# Patient Record
Sex: FEMALE | Race: BLACK OR AFRICAN AMERICAN | Employment: OTHER | URBAN - METROPOLITAN AREA
[De-identification: names, ages, dates, MRNs, and addresses within clinical notes are randomized per-mention and may not be internally consistent; named-entity substitution may affect disease eponyms.]

---

## 2021-04-13 ENCOUNTER — HOSPITAL ENCOUNTER (OUTPATIENT)
Age: 86
Setting detail: SPECIMEN
Discharge: HOME | End: 2021-04-13
Payer: MEDICARE

## 2021-04-13 LAB
A/G RATIO: 1.8 (ref 1.1–2.2)
ALBUMIN SERPL-MCNC: 4.4 G/DL (ref 3.4–5)
ALP BLD-CCNC: 49 U/L (ref 40–129)
ALT SERPL-CCNC: 10 U/L (ref 10–40)
ANION GAP SERPL CALCULATED.3IONS-SCNC: 9 MMOL/L (ref 3–16)
AST SERPL-CCNC: 24 U/L (ref 15–37)
BACTERIA: ABNORMAL /HPF
BASOPHILS ABSOLUTE: 0 K/UL (ref 0–0.2)
BASOPHILS RELATIVE PERCENT: 0.7 %
BILIRUB SERPL-MCNC: <0.2 MG/DL (ref 0–1)
BILIRUBIN URINE: NEGATIVE
BLOOD, URINE: NEGATIVE
BUN BLDV-MCNC: 28 MG/DL (ref 7–20)
CALCIUM SERPL-MCNC: 10 MG/DL (ref 8.3–10.6)
CHLORIDE BLD-SCNC: 104 MMOL/L (ref 99–110)
CLARITY: ABNORMAL
CO2: 28 MMOL/L (ref 21–32)
COLOR: YELLOW
COMMENT UA: ABNORMAL
CREAT SERPL-MCNC: 1.3 MG/DL (ref 0.6–1.2)
EOSINOPHILS ABSOLUTE: 0 K/UL (ref 0–0.6)
EOSINOPHILS RELATIVE PERCENT: 1.3 %
EPITHELIAL CELLS, UA: 4 /HPF (ref 0–5)
GFR AFRICAN AMERICAN: 46
GFR NON-AFRICAN AMERICAN: 38
GLOBULIN: 2.5 G/DL
GLUCOSE BLD-MCNC: 83 MG/DL (ref 70–99)
GLUCOSE URINE: NEGATIVE MG/DL
HCT VFR BLD CALC: 35.1 % (ref 36–48)
HEMOGLOBIN: 11 G/DL (ref 12–16)
HYALINE CASTS: 6 /LPF (ref 0–8)
KETONES, URINE: NEGATIVE MG/DL
LEUKOCYTE ESTERASE, URINE: ABNORMAL
LYMPHOCYTES ABSOLUTE: 1.1 K/UL (ref 1–5.1)
LYMPHOCYTES RELATIVE PERCENT: 30.5 %
MCH RBC QN AUTO: 27.4 PG (ref 26–34)
MCHC RBC AUTO-ENTMCNC: 31.4 G/DL (ref 31–36)
MCV RBC AUTO: 87.3 FL (ref 80–100)
MICROSCOPIC EXAMINATION: YES
MONOCYTES ABSOLUTE: 0.2 K/UL (ref 0–1.3)
MONOCYTES RELATIVE PERCENT: 6 %
NEUTROPHILS ABSOLUTE: 2.2 K/UL (ref 1.7–7.7)
NEUTROPHILS RELATIVE PERCENT: 61.5 %
NITRITE, URINE: NEGATIVE
PDW BLD-RTO: 15.2 % (ref 12.4–15.4)
PH UA: 5 (ref 5–8)
PLATELET # BLD: 193 K/UL (ref 135–450)
PMV BLD AUTO: 9.3 FL (ref 5–10.5)
POTASSIUM SERPL-SCNC: 5 MMOL/L (ref 3.5–5.1)
PROTEIN UA: NEGATIVE MG/DL
RBC # BLD: 4.02 M/UL (ref 4–5.2)
RBC UA: 4 /HPF (ref 0–4)
SODIUM BLD-SCNC: 141 MMOL/L (ref 136–145)
SPECIFIC GRAVITY UA: 1.02 (ref 1–1.03)
TOTAL CK: 128 U/L (ref 26–192)
TOTAL PROTEIN: 6.9 G/DL (ref 6.4–8.2)
TSH REFLEX: 2.61 UIU/ML (ref 0.27–4.2)
URINE TYPE: ABNORMAL
UROBILINOGEN, URINE: 0.2 E.U./DL
WBC # BLD: 3.6 K/UL (ref 4–11)
WBC UA: 23 /HPF (ref 0–5)

## 2021-04-13 PROCEDURE — 85025 COMPLETE CBC W/AUTO DIFF WBC: CPT

## 2021-04-13 PROCEDURE — 80053 COMPREHEN METABOLIC PANEL: CPT

## 2021-04-13 PROCEDURE — 36415 COLL VENOUS BLD VENIPUNCTURE: CPT

## 2021-04-13 PROCEDURE — 82550 ASSAY OF CK (CPK): CPT

## 2021-04-13 PROCEDURE — 82728 ASSAY OF FERRITIN: CPT

## 2021-04-13 PROCEDURE — 87086 URINE CULTURE/COLONY COUNT: CPT

## 2021-04-13 PROCEDURE — 81001 URINALYSIS AUTO W/SCOPE: CPT

## 2021-04-13 PROCEDURE — 87077 CULTURE AEROBIC IDENTIFY: CPT

## 2021-04-13 PROCEDURE — 83550 IRON BINDING TEST: CPT

## 2021-04-13 PROCEDURE — 84443 ASSAY THYROID STIM HORMONE: CPT

## 2021-04-13 PROCEDURE — 82746 ASSAY OF FOLIC ACID SERUM: CPT

## 2021-04-13 PROCEDURE — 82607 VITAMIN B-12: CPT

## 2021-04-13 PROCEDURE — 87184 SC STD DISK METHOD PER PLATE: CPT

## 2021-04-13 PROCEDURE — 83540 ASSAY OF IRON: CPT

## 2021-04-13 PROCEDURE — 87186 SC STD MICRODIL/AGAR DIL: CPT

## 2021-04-14 LAB
FERRITIN: 139.5 NG/ML (ref 15–150)
FOLATE: 13.56 NG/ML (ref 4.78–24.2)
IRON SATURATION: 20 % (ref 15–50)
IRON: 51 UG/DL (ref 37–145)
TOTAL IRON BINDING CAPACITY: 250 UG/DL (ref 260–445)
VITAMIN B-12: 596 PG/ML (ref 211–911)

## 2021-04-16 LAB
ORGANISM: ABNORMAL
URINE CULTURE, ROUTINE: ABNORMAL
URINE CULTURE, ROUTINE: ABNORMAL

## 2023-01-11 ENCOUNTER — HOSPITAL ENCOUNTER (EMERGENCY)
Age: 88
Discharge: HOME | End: 2023-01-12
Attending: STUDENT IN AN ORGANIZED HEALTH CARE EDUCATION/TRAINING PROGRAM
Payer: MEDICARE

## 2023-01-11 VITALS
BODY MASS INDEX: 17.42 KG/M2 | HEART RATE: 75 BPM | SYSTOLIC BLOOD PRESSURE: 138 MMHG | WEIGHT: 102 LBS | HEIGHT: 64 IN | OXYGEN SATURATION: 100 % | RESPIRATION RATE: 16 BRPM | TEMPERATURE: 98.3 F | DIASTOLIC BLOOD PRESSURE: 68 MMHG

## 2023-01-11 DIAGNOSIS — R04.0 EPISTAXIS: Primary | ICD-10-CM

## 2023-01-11 LAB
BASOPHILS ABSOLUTE: 0.1 K/UL (ref 0–0.2)
BASOPHILS RELATIVE PERCENT: 1 %
EOSINOPHILS ABSOLUTE: 0.1 K/UL (ref 0–0.6)
EOSINOPHILS RELATIVE PERCENT: 1.8 %
HCT VFR BLD CALC: 25.6 % (ref 36–48)
HEMOGLOBIN: 8.4 G/DL (ref 12–16)
INR BLD: 1.24 (ref 0.87–1.14)
LYMPHOCYTES ABSOLUTE: 0.8 K/UL (ref 1–5.1)
LYMPHOCYTES RELATIVE PERCENT: 14.7 %
MCH RBC QN AUTO: 27.8 PG (ref 26–34)
MCHC RBC AUTO-ENTMCNC: 32.7 G/DL (ref 31–36)
MCV RBC AUTO: 85 FL (ref 80–100)
MONOCYTES ABSOLUTE: 0.5 K/UL (ref 0–1.3)
MONOCYTES RELATIVE PERCENT: 9 %
NEUTROPHILS ABSOLUTE: 3.9 K/UL (ref 1.7–7.7)
NEUTROPHILS RELATIVE PERCENT: 73.5 %
PDW BLD-RTO: 14.8 % (ref 12.4–15.4)
PLATELET # BLD: 415 K/UL (ref 135–450)
PMV BLD AUTO: 7.8 FL (ref 5–10.5)
PROTHROMBIN TIME: 15.5 SEC (ref 11.7–14.5)
RBC # BLD: 3.02 M/UL (ref 4–5.2)
WBC # BLD: 5.4 K/UL (ref 4–11)

## 2023-01-11 PROCEDURE — 99283 EMERGENCY DEPT VISIT LOW MDM: CPT

## 2023-01-11 PROCEDURE — 36415 COLL VENOUS BLD VENIPUNCTURE: CPT

## 2023-01-11 PROCEDURE — 85610 PROTHROMBIN TIME: CPT

## 2023-01-11 PROCEDURE — 85025 COMPLETE CBC W/AUTO DIFF WBC: CPT

## 2023-01-11 PROCEDURE — 2500000003 HC RX 250 WO HCPCS: Performed by: STUDENT IN AN ORGANIZED HEALTH CARE EDUCATION/TRAINING PROGRAM

## 2023-01-11 PROCEDURE — 6370000000 HC RX 637 (ALT 250 FOR IP): Performed by: STUDENT IN AN ORGANIZED HEALTH CARE EDUCATION/TRAINING PROGRAM

## 2023-01-11 RX ORDER — OXYMETAZOLINE HYDROCHLORIDE 0.05 G/100ML
2 SPRAY NASAL ONCE
Status: COMPLETED | OUTPATIENT
Start: 2023-01-11 | End: 2023-01-11

## 2023-01-11 RX ORDER — TRANEXAMIC ACID 100 MG/ML
1000 INJECTION, SOLUTION INTRAVENOUS ONCE
Status: COMPLETED | OUTPATIENT
Start: 2023-01-11 | End: 2023-01-11

## 2023-01-11 RX ADMIN — OXYMETAZOLINE HCL 2 SPRAY: 0.05 SPRAY NASAL at 20:54

## 2023-01-11 RX ADMIN — TRANEXAMIC ACID 1000 MG: 1 INJECTION, SOLUTION INTRAVENOUS at 22:27

## 2023-01-11 RX ADMIN — Medication 1 EACH: at 23:36

## 2023-01-11 ASSESSMENT — PAIN - FUNCTIONAL ASSESSMENT: PAIN_FUNCTIONAL_ASSESSMENT: NONE - DENIES PAIN

## 2023-01-12 NOTE — ED PROVIDER NOTES
THE [facility]  EMERGENCY DEPARTMENT ENCOUNTER          EM RESIDENT NOTE     Date of Evaluation: 1/11/2023    Chief Complaint     Epistaxis (Pt arrived via EMS from [facility] after a spontaneous nosebleed per nursing staff and EMS.  No trauma or fall.)    History of Present Illness     Nimisha Rutherford is a 89 y.o. female with PMHx T2DM, HTN, HLD who presents with epistaxis.  Patient presents via EMS from [facility] after a spontaneous nosebleed per nursing staff.  Patient denies any trauma to the area or any additional trauma.  She states that she has had nosebleeds in the past but is never had 1 so severe.  She does not take any anticoagulants or other blood thinning medications.  She denies any other symptoms at this time.  She has been applying pressure without any resolution of her epistaxis.  She has passed several large clots.    On ROS, she denies any fevers, chills, sore throat, cough, shortness of breath, chest pain, abdominal pain, nausea/vomiting, diarrhea, hematochezia, melena, lower extremity swelling, rash, headache, lightheadedness, dizziness, loss of consciousness.    Review of Systems     Review of Systems   All other systems reviewed and are negative.    Past Medical, Surgical, Family, and Social History     She has a past medical history of Diabetes mellitus (HCC), HLD (hyperlipidemia), and HTN (hypertension).  She has no past surgical history on file.  Her family history is not on file.  She reports that she has never smoked. She does not have any smokeless tobacco history on file. She reports that she does not drink alcohol and does not use drugs.    Medications     Discharge Medication List as of 1/12/2023 12:54 AM        CONTINUE these medications which have NOT CHANGED    Details   ezetimibe (ZETIA) 10 MG tablet Take 1 tablet by mouth daily, Disp-90 tablet, R-0Normal      rosuvastatin (CRESTOR) 20 MG tablet Take 1 tablet by mouth daily, Disp-30 tablet, R-5     amLODIPine (NORVASC) 10 MG tablet Take 10 mg by mouth daily       donepezil (ARICEPT) 5 MG tablet Take 5 mg by mouth      linagliptin (TRADJENTA) 5 MG tablet Take 5 mg by mouth      lisinopril (PRINIVIL;ZESTRIL) 5 MG tablet Take 5 mg by mouth daily       timolol (TIMOPTIC) 0.5 % ophthalmic solution Place 1 drop into both eyes , R-6      aspirin 81 MG tablet Take 81 mg by mouth daily           Allergies     She has No Known Allergies. Physical Exam     INITIAL VITALS:   BP: 138/68, Temp: 98.3 °F (36.8 °C), Heart Rate: 75, Resp: 16, SpO2: 100 %     General: 80 y.o. female, non-toxic appearing; in no apparent distress. Head: Normocephalic, atraumatic. Eyes: Anicteric. PERRL, EOMI.  ENT: Mucous membranes are moist. Significant epistaxis primarily from her right nare. Passage of several large clots. Unable to identify site of bleeding given ongoing epistaxis. No evidence of obvious trauma. Neck: Supple, full ROM, trachea midline. Pulmonary: Non-labored breathing. Lungs clear to auscultation bilaterally with symmetric aeration. No wheezes/rales/rhonchi. Cardiac: Regular rate and rhythm. No murmurs/rubs/gallops. Abdomen: Soft, non-tender, non-distended. No rebound or guarding. Musculoskeletal: No long bone extremity deformity. Extremities: Warm and well-perfused. No peripheral edema. Skin: No rashes or bruising. Neuro: Alert and oriented x3. Speech normal. Moves UE and LE spontaneously and symmetrically. Psych: Mood and affect appropriate for situation.     Diagnostic Results     EKG:  None    RADIOLOGY:  No orders to display     LABS:  Results for orders placed or performed during the hospital encounter of 01/11/23   CBC with Auto Differential   Result Value Ref Range    WBC 5.4 4.0 - 11.0 K/uL    RBC 3.02 (L) 4.00 - 5.20 M/uL    Hemoglobin 8.4 (L) 12.0 - 16.0 g/dL    Hematocrit 25.6 (L) 36.0 - 48.0 %    MCV 85.0 80.0 - 100.0 fL    MCH 27.8 26.0 - 34.0 pg    MCHC 32.7 31.0 - 36.0 g/dL    RDW 14.8 12.4 - 15.4 %    Platelets 246 569 - 525 K/uL    MPV 7.8 5.0 - 10.5 fL    Neutrophils % 73.5 %    Lymphocytes % 14.7 %    Monocytes % 9.0 %    Eosinophils % 1.8 %    Basophils % 1.0 %    Neutrophils Absolute 3.9 1.7 - 7.7 K/uL    Lymphocytes Absolute 0.8 (L) 1.0 - 5.1 K/uL    Monocytes Absolute 0.5 0.0 - 1.3 K/uL    Eosinophils Absolute 0.1 0.0 - 0.6 K/uL    Basophils Absolute 0.1 0.0 - 0.2 K/uL   Protime-INR   Result Value Ref Range    Protime 15.5 (H) 11.7 - 14.5 sec    INR 1.24 (H) 0.87 - 1.14     ED BEDSIDE ULTRASOUND:  None    RECENT VITALS:   BP: 138/68, Temp: 98.3 °F (36.8 °C), Heart Rate: 75,Resp: 16, SpO2: 100 %     Procedures     None    ED Course     Nursing Notes, Past Medical Hx, Past Surgical Hx, Social Hx, Allergies, and Family Hx were reviewed. PATIENT GIVEN FOLLOWING MEDICATIONS:  Orders Placed This Encounter   Medications    oxymetazoline (AFRIN) 0.05 % nasal spray 2 spray    tranexamic acid (CYKLOKAPRON) injection 1,000 mg    silver nitrate applicators applicator 1 each     CONSULTS:  None    MEDICAL DECISION MAKING / ASSESSMENT / Susan Grove is a 80 y.o. female who presents with spontaneous epistaxis. Upon presentation, the patient was non-toxic appearing; afebrile, and hemodynamically stable. Reassuringly, patient is without any respiratory distress and is tolerating her secretions. She denies any anticoagulation use. We will attempt control of her epistaxis using Afrin, topical TXA, and direct pressure. Additionally, we will check a CBC given her baseline hemoglobin is ~9 to assess for any anemia secondary to her ongoing epistaxis. Additionally we will check a PT/INR to assess for a significant coagulopathy. ED Course as of 01/12/23 0417 Wed Jan 11, 2023 2145 Patient's epistaxis has stopped at this time after 2 treatments with Afrin. We will continue to monitor in the emergency department for rebleeding [CB]   2244 INR(!): 1.24  Mildly elevated.   Patient denies any anticoagulant use [CB]   2245 Hemoglobin Quant(!): 8.4  Anemia to 8.4, grossly unchanged from most recent prior hemoglobin of 8.9 on 1/6/2023 [CB]   2330 Patient has been monitored for ~2 hours after her epistaxis stopped after treatment with Afrin, topical TXA, and pressure. Examined patient's nasopharynx and identified site of bleeding at [facility] plexus in her right nare. As such, chemically cauterized the area with silver nitrate. Patient experienced no rebleeding following this. Patient does not ambulate at baseline so we will defer ambulating the patient to assess for rebleeding. Patient is safe for discharge at this time. Provided patient with nose clamps and counseled her how to stop any recurrent bleeding. Recommended she follow-up with her PCP [CB]      ED Course User Index  [CB] Mj Frederick MD     Medical Decision Making  Problems Addressed:  Epistaxis: acute illness or injury    Amount and/or Complexity of Data Reviewed  Independent Historian: caregiver and EMS     Details: Reviewed external labs for baseline Hgb  External Data Reviewed: labs. Labs: ordered. Decision-making details documented in ED Course. Risk  OTC drugs. Prescription drug management. This patient was also evaluated by the attending physician. All care plans were discussed and agreed upon. Clinical Impression     1. Epistaxis      Disposition     PATIENT REFERRED TO:  Valerie White MD        DISCHARGE MEDICATIONS:  Discharge Medication List as of 1/12/2023 12:54 AM        DISPOSITION Decision To Discharge 01/11/2023 11:43:45 PM  At this time the patient has been deemed safe for discharge. Risks, benefits, and alternatives were discussed. My customary discharge instructions including strict return precautions for worsening or new symptoms have been communicated. The patient was advised to follow up with her PCP.     Mj Frederick MD   Emergency Medicine, PGY-2    This note was dictated using voice-recognition software, which occasionally leads to inadvertent typographic errors.        Karlene Pablo MD  01/12/23 8999

## 2023-01-12 NOTE — DISCHARGE INSTRUCTIONS
You were seen in the emergency department with nosebleeding. We were able to stop your nosebleeding with pressure and several medications that help constrict your blood vessels. Additionally, we identified the site of the nosebleed in your right nostril. We used a chemical to cauterize this area to prevent further bleeding. If you have recurrent bleeding at your facility, please lean forward and apply the nose clamp as we have provided to try and stop the bleeding. Please return to the emergency department if you develop nosebleeding that will not stop, fevers, chest pain, shortness of breath, or any other symptoms that concern you.

## 2023-01-12 NOTE — ED PROVIDER NOTES
ED Attending Attestation Note     Date of evaluation: 1/11/2023    This patient was seen by the resident. I have seen and examined the patient, agree with the workup, evaluation, management and diagnosis. The care plan has been discussed. Briefly, this is a patient with a past medical history of hypertension, diabetes, hyperlipidemia who is not on any blood thinner medications who presents with spontaneous nosebleed. On arrival, she was noted to have brisk, bright red bleeding predominantly coming from the left nare with evidence of blood in the posterior oropharynx. She was not in any respiratory distress and tolerating her secretions. Patient had hemostasis achieved after 2 rounds of Afrin and direct pressure. We will continue to monitor for rebleeding at which point, next interventions could include topical TXA pledget followed by anterior nasal packing if unsuccessful.   Given her baseline hemoglobin is approximately 9, will check CBC although suspicion for very clinically significant degree of blood loss is relatively low         Sandi Licea MD  01/11/23 [id number]

## 2023-01-12 NOTE — ED NOTES
Attempted to call report to [facility] at this time, no answer.      Flori Cook RN  01/12/23 044